# Patient Record
Sex: FEMALE | Race: WHITE | NOT HISPANIC OR LATINO | Employment: OTHER | URBAN - NONMETROPOLITAN AREA
[De-identification: names, ages, dates, MRNs, and addresses within clinical notes are randomized per-mention and may not be internally consistent; named-entity substitution may affect disease eponyms.]

---

## 2017-08-25 ENCOUNTER — TRANSCRIBE ORDERS (OUTPATIENT)
Dept: ADMINISTRATIVE | Facility: HOSPITAL | Age: 57
End: 2017-08-25

## 2017-08-25 DIAGNOSIS — Z12.31 VISIT FOR SCREENING MAMMOGRAM: Primary | ICD-10-CM

## 2017-09-13 ENCOUNTER — HOSPITAL ENCOUNTER (OUTPATIENT)
Dept: MAMMOGRAPHY | Facility: HOSPITAL | Age: 57
Discharge: HOME | End: 2017-09-13
Attending: INTERNAL MEDICINE | Admitting: INTERNAL MEDICINE

## 2017-09-13 DIAGNOSIS — Z12.31 VISIT FOR SCREENING MAMMOGRAM: ICD-10-CM

## 2017-09-13 PROCEDURE — G0202 SCR MAMMO BI INCL CAD: HCPCS

## 2017-09-13 PROCEDURE — 77063 BREAST TOMOSYNTHESIS BI: CPT | Performed by: RADIOLOGY

## 2017-09-13 PROCEDURE — 77063 BREAST TOMOSYNTHESIS BI: CPT

## 2017-09-13 PROCEDURE — 77067 SCR MAMMO BI INCL CAD: CPT | Performed by: RADIOLOGY

## 2017-10-25 ENCOUNTER — HOSPITAL ENCOUNTER (OUTPATIENT)
Dept: ULTRASOUND IMAGING | Facility: HOSPITAL | Age: 57
Discharge: HOME | End: 2017-10-25
Attending: RADIOLOGY | Admitting: RADIOLOGY

## 2017-10-25 DIAGNOSIS — R92.8 ABNORMAL MAMMOGRAM: ICD-10-CM

## 2017-10-25 PROCEDURE — 76642 ULTRASOUND BREAST LIMITED: CPT | Performed by: RADIOLOGY

## 2017-10-25 PROCEDURE — 76642 ULTRASOUND BREAST LIMITED: CPT

## 2019-05-30 ENCOUNTER — TRANSCRIBE ORDERS (OUTPATIENT)
Dept: LAB | Facility: HOSPITAL | Age: 59
End: 2019-05-30

## 2019-05-30 ENCOUNTER — HOSPITAL ENCOUNTER (OUTPATIENT)
Dept: GENERAL RADIOLOGY | Facility: HOSPITAL | Age: 59
Discharge: HOME | End: 2019-05-30
Admitting: NURSE PRACTITIONER

## 2019-05-30 DIAGNOSIS — M25.551 RIGHT HIP PAIN: ICD-10-CM

## 2019-05-30 DIAGNOSIS — M25.551 RIGHT HIP PAIN: Primary | ICD-10-CM

## 2019-05-30 PROCEDURE — 73502 X-RAY EXAM HIP UNI 2-3 VIEWS: CPT

## 2019-05-30 PROCEDURE — 73502 X-RAY EXAM HIP UNI 2-3 VIEWS: CPT | Performed by: RADIOLOGY

## 2020-05-15 ENCOUNTER — TRANSCRIBE ORDERS (OUTPATIENT)
Dept: ADMINISTRATIVE | Facility: HOSPITAL | Age: 60
End: 2020-05-15

## 2020-05-15 DIAGNOSIS — R74.8 ELEVATED LIVER ENZYMES: Primary | ICD-10-CM

## 2020-05-19 ENCOUNTER — HOSPITAL ENCOUNTER (OUTPATIENT)
Dept: ULTRASOUND IMAGING | Facility: HOSPITAL | Age: 60
Discharge: HOME | End: 2020-05-19
Admitting: NURSE PRACTITIONER

## 2020-05-19 DIAGNOSIS — R74.8 ELEVATED LIVER ENZYMES: ICD-10-CM

## 2020-05-19 PROCEDURE — 76700 US EXAM ABDOM COMPLETE: CPT

## 2020-05-19 PROCEDURE — 76700 US EXAM ABDOM COMPLETE: CPT | Performed by: RADIOLOGY

## 2021-03-10 ENCOUNTER — IMMUNIZATION (OUTPATIENT)
Dept: VACCINE CLINIC | Facility: HOSPITAL | Age: 61
End: 2021-03-10

## 2021-03-10 PROCEDURE — 0001A: CPT | Performed by: INTERNAL MEDICINE

## 2021-03-10 PROCEDURE — 91300 HC SARSCOV02 VAC 30MCG/0.3ML IM: CPT | Performed by: INTERNAL MEDICINE

## 2021-03-31 ENCOUNTER — IMMUNIZATION (OUTPATIENT)
Dept: VACCINE CLINIC | Facility: HOSPITAL | Age: 61
End: 2021-03-31

## 2021-03-31 PROCEDURE — 91300 HC SARSCOV02 VAC 30MCG/0.3ML IM: CPT | Performed by: INTERNAL MEDICINE

## 2021-03-31 PROCEDURE — 0002A: CPT | Performed by: INTERNAL MEDICINE

## 2021-05-12 ENCOUNTER — APPOINTMENT (OUTPATIENT)
Dept: MAMMOGRAPHY | Facility: HOSPITAL | Age: 61
End: 2021-05-12

## 2021-07-28 ENCOUNTER — HOSPITAL ENCOUNTER (OUTPATIENT)
Dept: HOSPITAL 79 - RAD | Age: 61
End: 2021-07-28
Attending: SURGERY
Payer: OTHER GOVERNMENT

## 2021-07-28 DIAGNOSIS — K59.00: ICD-10-CM

## 2021-07-28 DIAGNOSIS — K92.2: ICD-10-CM

## 2021-07-28 DIAGNOSIS — R10.32: Primary | ICD-10-CM

## 2021-09-03 ENCOUNTER — TRANSCRIBE ORDERS (OUTPATIENT)
Dept: ADMINISTRATIVE | Facility: HOSPITAL | Age: 61
End: 2021-09-03

## 2021-09-03 DIAGNOSIS — R01.1 HEART MURMUR: Primary | ICD-10-CM

## 2021-09-14 ENCOUNTER — HOSPITAL ENCOUNTER (OUTPATIENT)
Dept: CARDIOLOGY | Facility: HOSPITAL | Age: 61
Discharge: HOME | End: 2021-09-14
Admitting: NURSE PRACTITIONER

## 2021-09-14 DIAGNOSIS — R01.1 HEART MURMUR: ICD-10-CM

## 2021-09-14 LAB
BH CV ECHO MEAS - % IVS THICK: 6.9 %
BH CV ECHO MEAS - % LVPW THICK: 64.4 %
BH CV ECHO MEAS - ACS: 1.8 CM
BH CV ECHO MEAS - AO MAX PG: 16.5 MMHG
BH CV ECHO MEAS - AO MEAN PG: 7 MMHG
BH CV ECHO MEAS - AO ROOT AREA (BSA CORRECTED): 1.5
BH CV ECHO MEAS - AO ROOT AREA: 5.9 CM^2
BH CV ECHO MEAS - AO ROOT DIAM: 2.8 CM
BH CV ECHO MEAS - AO V2 MAX: 203 CM/SEC
BH CV ECHO MEAS - AO V2 MEAN: 123 CM/SEC
BH CV ECHO MEAS - AO V2 VTI: 36.4 CM
BH CV ECHO MEAS - BSA(HAYCOCK): 2 M^2
BH CV ECHO MEAS - BSA: 1.9 M^2
BH CV ECHO MEAS - BZI_BMI: 37.8 KILOGRAMS/M^2
BH CV ECHO MEAS - BZI_METRIC_HEIGHT: 154.9 CM
BH CV ECHO MEAS - BZI_METRIC_WEIGHT: 90.7 KG
BH CV ECHO MEAS - EDV(CUBED): 65 ML
BH CV ECHO MEAS - EDV(MOD-SP4): 33.9 ML
BH CV ECHO MEAS - EDV(TEICH): 70.8 ML
BH CV ECHO MEAS - EF(CUBED): 68.5 %
BH CV ECHO MEAS - EF(MOD-SP4): 51.9 %
BH CV ECHO MEAS - EF(TEICH): 60.6 %
BH CV ECHO MEAS - ESV(CUBED): 20.5 ML
BH CV ECHO MEAS - ESV(MOD-SP4): 16.3 ML
BH CV ECHO MEAS - ESV(TEICH): 27.9 ML
BH CV ECHO MEAS - FS: 32 %
BH CV ECHO MEAS - IVS/LVPW: 1.2
BH CV ECHO MEAS - IVSD: 0.97 CM
BH CV ECHO MEAS - IVSS: 1 CM
BH CV ECHO MEAS - LA DIMENSION: 2.7 CM
BH CV ECHO MEAS - LA/AO: 0.98
BH CV ECHO MEAS - LV DIASTOLIC VOL/BSA (35-75): 17.9 ML/M^2
BH CV ECHO MEAS - LV MASS(C)D: 109.5 GRAMS
BH CV ECHO MEAS - LV MASS(C)DI: 58 GRAMS/M^2
BH CV ECHO MEAS - LV MASS(C)S: 94.9 GRAMS
BH CV ECHO MEAS - LV MASS(C)SI: 50.2 GRAMS/M^2
BH CV ECHO MEAS - LV SYSTOLIC VOL/BSA (12-30): 8.6 ML/M^2
BH CV ECHO MEAS - LVIDD: 4 CM
BH CV ECHO MEAS - LVIDS: 2.7 CM
BH CV ECHO MEAS - LVLD AP4: 6.7 CM
BH CV ECHO MEAS - LVLS AP4: 6.7 CM
BH CV ECHO MEAS - LVOT AREA (M): 2.3 CM^2
BH CV ECHO MEAS - LVOT AREA: 2.3 CM^2
BH CV ECHO MEAS - LVOT DIAM: 1.7 CM
BH CV ECHO MEAS - LVPWD: 0.82 CM
BH CV ECHO MEAS - LVPWS: 1.3 CM
BH CV ECHO MEAS - MV A MAX VEL: 98.5 CM/SEC
BH CV ECHO MEAS - MV E MAX VEL: 79 CM/SEC
BH CV ECHO MEAS - MV E/A: 0.8
BH CV ECHO MEAS - PA ACC TIME: 0.09 SEC
BH CV ECHO MEAS - PA PR(ACCEL): 37.6 MMHG
BH CV ECHO MEAS - RAP SYSTOLE: 10 MMHG
BH CV ECHO MEAS - RVSP: 28.5 MMHG
BH CV ECHO MEAS - SI(AO): 114.4 ML/M^2
BH CV ECHO MEAS - SI(CUBED): 23.6 ML/M^2
BH CV ECHO MEAS - SI(MOD-SP4): 9.3 ML/M^2
BH CV ECHO MEAS - SI(TEICH): 22.7 ML/M^2
BH CV ECHO MEAS - SV(AO): 216.2 ML
BH CV ECHO MEAS - SV(CUBED): 44.5 ML
BH CV ECHO MEAS - SV(MOD-SP4): 17.6 ML
BH CV ECHO MEAS - SV(TEICH): 42.9 ML
BH CV ECHO MEAS - TR MAX VEL: 215 CM/SEC
MAXIMAL PREDICTED HEART RATE: 158 BPM
STRESS TARGET HR: 134 BPM

## 2021-09-14 PROCEDURE — 93306 TTE W/DOPPLER COMPLETE: CPT | Performed by: INTERNAL MEDICINE

## 2021-09-14 PROCEDURE — 93306 TTE W/DOPPLER COMPLETE: CPT

## 2021-12-23 ENCOUNTER — HOSPITAL ENCOUNTER (OUTPATIENT)
Dept: GENERAL RADIOLOGY | Facility: HOSPITAL | Age: 61
Discharge: HOME | End: 2021-12-23
Admitting: NURSE PRACTITIONER

## 2021-12-23 ENCOUNTER — TRANSCRIBE ORDERS (OUTPATIENT)
Dept: ADMINISTRATIVE | Facility: HOSPITAL | Age: 61
End: 2021-12-23

## 2021-12-23 DIAGNOSIS — M25.552 LEFT HIP PAIN: ICD-10-CM

## 2021-12-23 DIAGNOSIS — M25.552 LEFT HIP PAIN: Primary | ICD-10-CM

## 2021-12-23 PROCEDURE — 73502 X-RAY EXAM HIP UNI 2-3 VIEWS: CPT | Performed by: RADIOLOGY

## 2021-12-23 PROCEDURE — 73502 X-RAY EXAM HIP UNI 2-3 VIEWS: CPT

## 2022-03-22 ENCOUNTER — HOSPITAL ENCOUNTER (OUTPATIENT)
Dept: HOSPITAL 79 - OPSV2 | Age: 62
End: 2022-03-22
Attending: ORTHOPAEDIC SURGERY
Payer: OTHER GOVERNMENT

## 2022-03-22 DIAGNOSIS — Z01.818: Primary | ICD-10-CM

## 2022-03-22 DIAGNOSIS — M16.12: ICD-10-CM

## 2022-03-22 LAB
BUN/CREATININE RATIO: 33 (ref 0–10)
HGB BLD-MCNC: 14.8 GM/DL (ref 12.3–15.3)
RED BLOOD COUNT: 4.76 M/UL (ref 4–5.1)
WHITE BLOOD COUNT: 10.8 K/UL (ref 4.5–11)

## 2022-04-03 ENCOUNTER — HOSPITAL ENCOUNTER (OUTPATIENT)
Dept: HOSPITAL 79 - LAB | Age: 62
End: 2022-04-03
Attending: ORTHOPAEDIC SURGERY
Payer: OTHER GOVERNMENT

## 2022-04-03 DIAGNOSIS — Z01.812: Primary | ICD-10-CM

## 2022-04-03 LAB — BUN/CREATININE RATIO: 44 (ref 0–10)

## 2022-04-14 ENCOUNTER — TREATMENT (OUTPATIENT)
Dept: PHYSICAL THERAPY | Facility: CLINIC | Age: 62
End: 2022-04-14

## 2022-04-14 DIAGNOSIS — M25.552 LEFT HIP PAIN: ICD-10-CM

## 2022-04-14 DIAGNOSIS — Z96.642 HISTORY OF TOTAL LEFT HIP REPLACEMENT: Primary | ICD-10-CM

## 2022-04-14 PROCEDURE — 97162 PT EVAL MOD COMPLEX 30 MIN: CPT | Performed by: PHYSICAL THERAPIST

## 2022-04-14 NOTE — PROGRESS NOTES
Physical Therapy Initial Evaluation and Plan of Care    Patient: Indu Richards   : 1960  Diagnosis/ICD-10 Code:  History of total left hip replacement [Z96.642]  Referring practitioner: Clif Mesa MD  Date of Initial Visit: 2022  Today's Date: 2022  Patient seen for 1 session         Visit Diagnoses:    ICD-10-CM ICD-9-CM   1. History of total left hip replacement  Z96.642 V43.64   2. Left hip pain  M25.552 719.45         Subjective Questionnaire: LEFS: 84%      Subjective Evaluation    History of Present Illness  Date of surgery: 2022  Mechanism of injury: The patient has suffered from bilateral hip pain for the past two years.  The patient received a THR to the right hip in 2019.  She reports her left hip pain intensified this past Summer.  The patient was referred to MD Mesa and scheduled for a left hip EUGENIO on 2022.  Pt had no complications with surgery and was advised to receive therapy at this time.        Patient Occupation: retired   Precautions and Work Restrictions: WBAT; EUGENIO anterior approachQuality of life: good    Pain  Current pain ratin  At best pain ratin  At worst pain ratin  Location: left hip  Quality: burning, cramping, dull ache and sharp  Relieving factors: medications, ice, relaxation and rest  Aggravating factors: movement, stairs, standing, ambulation, squatting and repetitive movement  Progression: improved    Hand dominance: left    Patient Goals  Patient goals for therapy: decreased edema, decreased pain, improved balance, increased motion, increased strength, independence with ADLs/IADLs and return to sport/leisure activities             Objective          Observations     Additional Hip Observation Details  Pt reports left wound dressing is clean; decreased wb on left buttock in sitting    Neurological Testing     Sensation     Hip   Left Hip   Intact: light touch    Right Hip   Intact: light touch    Strength/Myotome  Testing     Left Hip   Planes of Motion   Flexion: 3- (NT with resistance)    Right Hip   Planes of Motion   Flexion: 4    Left Knee   Flexion: 3  Extension: 3+    Right Knee   Flexion: 4+  Extension: 4+    Left Ankle/Foot   Dorsiflexion: 4-  Plantar flexion: 4-    Right Ankle/Foot   Dorsiflexion: 4  Plantar flexion: 4    Ambulation     Comments   Pt amb with fwd flexed posture with decreased stance on left LE          Assessment & Plan     Assessment  Impairments: abnormal coordination, abnormal gait, abnormal muscle firing, abnormal muscle tone, abnormal or restricted ROM, activity intolerance, impaired balance, impaired physical strength, lacks appropriate home exercise program, pain with function, safety issue and weight-bearing intolerance  Functional Limitations: lifting, walking, pulling, pushing, uncomfortable because of pain, moving in bed, sitting, standing, stooping and unable to perform repetitive tasks  Assessment details: Pt is a 61 y/o female referred to therapy following a left EUGENIO.  Pt presents with decreased bed mobility, impaired transfers and impaired gait.  Therapy will follow for improved leg stability and safety with gait in order to improve functional independence.  Prognosis: good    Goals  Plan Goals: STG 6 weeks    1 Pt will be instructed in a HEP.  2 Pt will be able to amb household distance with use of cane ore lesser AD.  3 Pt will demonstrate 4/5 bilateral LE gross strength.    LTG  12 weeks    1 Pt will improve her LEFs to less than 30%.  2 Pt will report pain no greater than 3/10 with ADLs.  3 Pt will amb with no AD with good symmetry and stride length.  4 Pt will be able to walk for greater than 10 min without required rest breaks.    Plan  Therapy options: will be seen for skilled therapy services  Planned modality interventions: thermotherapy (hydrocollator packs) and cryotherapy  Planned therapy interventions: abdominal trunk stabilization, ADL retraining, balance/weight-bearing  training, body mechanics training, flexibility, functional ROM exercises, gait training, home exercise program, IADL retraining, joint mobilization, manual therapy, neuromuscular re-education, postural training, soft tissue mobilization, strengthening, stretching, therapeutic activities and motor coordination training  Frequency: 2x week  Duration in weeks: 12  Treatment plan discussed with: patient  Plan details: Will follow for optimal gains.  Moderate Evaluation  03847    Re-evaluation   16839  Therapeutic exercise  19590  Therapeutic activity    17230  Neuromuscular re-education   39682  Manual therapy   75864  Gait training  44818  Unattended e-stim (Medicaid/Medicare)     Moist heat/cryotherapy 49782   Ultrasound   05506            Timed:         Manual Therapy:         mins  65501;     Therapeutic Exercise:         mins  29302;     Neuromuscular Juan Daniel:        mins  31147;    Therapeutic Activity:          mins  24102;     Gait Training:           mins  59318;     Ultrasound:          mins  79897;    Ionto                                   mins   35341  Self Care                            mins   15272  Canalith Repos         mins 12793      Un-Timed:  Electrical Stimulation:         mins  86077 ( );  Dry Needling          mins self-pay  Traction          mins 70030  Low Eval          Mins  63454  Mod Eval     40     Mins  52608  High Eval                            Mins  17312        Timed Treatment:      mins   Total Treatment:     40   mins          PT: Zachery Coronel PT     License Number: [id number]  Electronically signed by Zachery Coronel PT, 04/14/22, 8:54 AM EDT    Certification Period: 4/14/2022 thru 7/12/2022  I certify that the therapy services are furnished while this patient is under my care.  The services outlined above are required by this patient, and will be reviewed every 90 days.         Physician Signature:__________________________________________________    PHYSICIAN:  Clif Mesa MD  NPI: [id number]                                      DATE:      Please sign and return via fax to [email or web address] . Thank you, [facility].

## 2022-04-19 ENCOUNTER — TREATMENT (OUTPATIENT)
Dept: PHYSICAL THERAPY | Facility: CLINIC | Age: 62
End: 2022-04-19

## 2022-04-19 DIAGNOSIS — Z96.642 HISTORY OF TOTAL LEFT HIP REPLACEMENT: Primary | ICD-10-CM

## 2022-04-19 DIAGNOSIS — M25.552 LEFT HIP PAIN: ICD-10-CM

## 2022-04-19 PROCEDURE — 97110 THERAPEUTIC EXERCISES: CPT | Performed by: PHYSICAL THERAPIST

## 2022-04-19 PROCEDURE — 97116 GAIT TRAINING THERAPY: CPT | Performed by: PHYSICAL THERAPIST

## 2022-04-19 NOTE — PROGRESS NOTES
Physical Therapy Daily Treatment Note      Patient: Indu Richards   : 1960  Referring practitioner: Clif Mesa MD  Date of Initial Visit: Type: THERAPY  Noted: 2022  Today's Date: 2022  Patient seen for 2 sessions       Visit Diagnoses:    ICD-10-CM ICD-9-CM   1. History of total left hip replacement  Z96.642 V43.64   2. Left hip pain  M25.552 719.45       Subjective:  Patient arrives to therapy w/ reports of 3/10 left hip pain.  Pt verbalizes compliance of initiating home program w/ no complaints.  Pt states she has been walking with a cane the past few days.      Objective   See Exercise, Manual, and Modality Logs for complete treatment.       Assessment/Plan:  Patient responded well to today's session w/ no complaints of pain increase noted following, 3/10.  Treatment initiated w/ supine and seated mat strengthening and range of motion activities f/b weight shifts and gait training w/ assistive device.  Pt was provided w/ cues and demonstration during exercise and gait to maintain good form, and for max benefit.  Pt was educated to utilize rolling walker intermittently with cane as needed if pain increases with walking.  Pt verbalized understanding.  Cryotherapy applied at conclusion.  No adverse reactions or signs of distress observed during, and/ or following tx.  Pt continues to benefit from therapy services, and will be progressed as tolerated to address goals, improve strength, and restore gait.  Continue w/ PT's POC.       Timed:         Manual Therapy:         mins  20382;     Therapeutic Exercise:    36     mins  72258;     Neuromuscular Juan Daniel:        mins  56645;    Therapeutic Activity:          mins  21016;     Gait Trainin     mins  73734;     Ultrasound:          mins  17764;    Ionto                                   mins   66514  Self Care                            mins   29656  Canalith Repos         mins 81633      Un-Timed:  Electrical Stimulation:          mins  43639 ( );  Dry Needling          mins self-pay  Traction          mins 60473      Timed Treatment:  45    mins   Total Treatment:    53    mins (CP: 8 min)    Karuna Jade. MARCI Agee  [place] License: [id number]

## 2022-04-26 ENCOUNTER — TREATMENT (OUTPATIENT)
Dept: PHYSICAL THERAPY | Facility: CLINIC | Age: 62
End: 2022-04-26

## 2022-04-26 DIAGNOSIS — M25.552 LEFT HIP PAIN: ICD-10-CM

## 2022-04-26 DIAGNOSIS — Z96.642 HISTORY OF TOTAL LEFT HIP REPLACEMENT: Primary | ICD-10-CM

## 2022-04-26 PROCEDURE — 97110 THERAPEUTIC EXERCISES: CPT | Performed by: PHYSICAL THERAPIST

## 2022-04-26 PROCEDURE — 97116 GAIT TRAINING THERAPY: CPT | Performed by: PHYSICAL THERAPIST

## 2022-04-26 NOTE — PROGRESS NOTES
Physical Therapy Daily Treatment Note      Patient: Indu Richards   : 1960  Referring practitioner: Clif Mesa MD  Date of Initial Visit: Type: THERAPY  Noted: 2022  Today's Date: 2022  Patient seen for 3 sessions       Visit Diagnoses:    ICD-10-CM ICD-9-CM   1. History of total left hip replacement  Z96.642 V43.64   2. Left hip pain  M25.552 719.45       Subjective Evaluation    History of Present Illness    Subjective comment: Pt reports going to the doctor on 22 and the bandages were removed. She states she had an area that was almost raw so she was unable to attend her PT session on 22. She states the area has improved. The patient reports 3/10 left hip pain prior to today's session. The patient reports she has the greatest amount of difficulty with right sciatic pain..Pain  Current pain rating: 3           Objective   See Exercise, Manual, and Modality Logs for complete treatment.       Assessment & Plan     Assessment    Assessment details: Therapeutic exercises were performed during today's treatment session for improved left lower extremity range of motion and strength. She demonstrated improvement with heel slides on sliding board, with patient able to perform five repetitions without assistance. Gait training was performed with a quad cane and an emphasis on weight shift. The patient was able to ascend/descend stairs with reciprocal gait and use of two handrails. Rest breaks were provided as needed secondary to fatigue. The patient reported 5/10 left hip pain following today's session.    Plan  Plan details: Progress weight bearing and weight shifting activities for improved balance and gait.          Timed:         Manual Therapy:         mins  93494;     Therapeutic Exercise:    35     mins  17592;     Neuromuscular Juan Daniel:        mins  40907;    Therapeutic Activity:          mins  68593;     Gait Training:     10      mins  45801;     Ultrasound:          mins   41300;    Ionto                                   mins   74344  Self Care                            mins   23554  Canalith Repos         mins 73008      Un-Timed:  Electrical Stimulation:         mins  68507 ( );  Dry Needling          mins self-pay  Traction          mins 53623      Timed Treatment:   45   mins   Total Treatment:     45   mins    Ashley Claudene Dalton, PT  [place] License: [id number]

## 2022-04-29 ENCOUNTER — TREATMENT (OUTPATIENT)
Dept: PHYSICAL THERAPY | Facility: CLINIC | Age: 62
End: 2022-04-29

## 2022-04-29 DIAGNOSIS — Z96.642 HISTORY OF TOTAL LEFT HIP REPLACEMENT: Primary | ICD-10-CM

## 2022-04-29 DIAGNOSIS — M25.552 LEFT HIP PAIN: ICD-10-CM

## 2022-04-29 PROCEDURE — 97110 THERAPEUTIC EXERCISES: CPT | Performed by: PHYSICAL THERAPIST

## 2022-04-29 PROCEDURE — 97116 GAIT TRAINING THERAPY: CPT | Performed by: PHYSICAL THERAPIST

## 2022-04-29 NOTE — PROGRESS NOTES
Physical Therapy Daily Treatment Note      Patient: Indu Richards   : 1960  Referring practitioner: Clif Mesa MD  Date of Initial Visit: Type: THERAPY  Noted: 2022  Today's Date: 2022  Patient seen for 4 sessions       Visit Diagnoses:    ICD-10-CM ICD-9-CM   1. History of total left hip replacement  Z96.642 V43.64   2. Left hip pain  M25.552 719.45       Subjective   Patient reports that she is sore prior yo treatment session. Patient states that she is up on her feet more so she is having more swelling in the left hip.    Objective   See Exercise, Manual, and Modality Logs for complete treatment.       Assessment/Plan  Patient tolerated treatment session well with rest breaks taken as needed by the patient. Educated patient to perform therex per her tolerance, patient verbalized understanding. No adverse reactions with modalities or treatment session. Reps increased on several exercises with no increase in pain noted. New therex added per the patient's tolerance, patient demonstrated and understood new therex with no increase in pain noted. Patient educated on continuing to use assistive device when out of house, patient verbalized understanding. Treatment session consisted of exercises to improve strength and ROM to the left hip. No pain noted following treatment session. Patient requested to ice her left hip at home. Continue per PT's POC, progress per the patient's tolerance.    Timed:         Manual Therapy:         mins  82324;     Therapeutic Exercise:    35     mins  57472;     Neuromuscular Juan Daniel:        mins  86231;    Therapeutic Activity:          mins  60498;     Gait Training:      10     mins  41636;     Ultrasound:          mins  84299;    Ionto                                   mins   27731  Self Care                            mins   92170  Canalith Repos         mins 08114      Un-Timed:  Electrical Stimulation:         mins  91005 ( );  Dry Needling           mins self-pay  Traction          mins 04561      Timed Treatment:   45   mins   Total Treatment:     45   mins    Ankita Henson, PTA  [place] License: [id number]

## 2022-05-03 ENCOUNTER — TREATMENT (OUTPATIENT)
Dept: PHYSICAL THERAPY | Facility: CLINIC | Age: 62
End: 2022-05-03

## 2022-05-03 DIAGNOSIS — M25.552 LEFT HIP PAIN: ICD-10-CM

## 2022-05-03 DIAGNOSIS — Z96.642 HISTORY OF TOTAL LEFT HIP REPLACEMENT: Primary | ICD-10-CM

## 2022-05-03 PROCEDURE — 97116 GAIT TRAINING THERAPY: CPT | Performed by: PHYSICAL THERAPIST

## 2022-05-03 PROCEDURE — 97110 THERAPEUTIC EXERCISES: CPT | Performed by: PHYSICAL THERAPIST

## 2022-05-03 NOTE — PROGRESS NOTES
Physical Therapy Daily Treatment Note      Patient: Indu Richards   : 1960  Referring practitioner: Clif Mesa MD  Date of Initial Visit: Type: THERAPY  Noted: 2022  Today's Date: 5/3/2022  Patient seen for 5 sessions       Visit Diagnoses:    ICD-10-CM ICD-9-CM   1. History of total left hip replacement  Z96.642 V43.64   2. Left hip pain  M25.552 719.45       Subjective Evaluation    History of Present Illness    Subjective comment: Pt reports having 5/10 pain today and pt did not sleep well last night.       Objective   See Exercise, Manual, and Modality Logs for complete treatment.       Assessment & Plan     Assessment    Assessment details: Tx today consisted of supine and sitting exercises; followed by standing pre gait and stair training. Pt responded well to added HR and responded well to added reps with most exercises today.  Pt was noted to have impairments with wt shiftng with gait today and reported mild pain with wt shifting slowly.  Pt reported 3/10 post pain with ice.    Plan  Plan details: Will follow progressing hip stability per protocol.          Timed:         Manual Therapy:         mins  60163;     Therapeutic Exercise:    32     mins  91946;     Neuromuscular Juan Daniel:        mins  16623;    Therapeutic Activity:          mins  35154;     Gait Trainin     mins  34639;     Ultrasound:          mins  66656;    Ionto                                   mins   84194  Self Care                            mins   48193  Canalith Repos         mins 42080      Un-Timed:  Electrical Stimulation:         mins  61842 ( );  Dry Needling          mins self-pay  Traction          mins 72557      Timed Treatment:   44   mins   Total Treatment:     44   mins    Zachery Coronel PT  [place] License: [id number]      Electronically signed by Zachery Coronel PT, 22, 8:54 AM EDT

## 2022-05-10 ENCOUNTER — TELEPHONE (OUTPATIENT)
Dept: PHYSICAL THERAPY | Facility: CLINIC | Age: 62
End: 2022-05-10

## 2022-07-12 ENCOUNTER — TRANSCRIBE ORDERS (OUTPATIENT)
Dept: ADMINISTRATIVE | Facility: HOSPITAL | Age: 62
End: 2022-07-12

## 2022-07-12 DIAGNOSIS — M54.41 CHRONIC RIGHT-SIDED LOW BACK PAIN WITH RIGHT-SIDED SCIATICA: Primary | ICD-10-CM

## 2022-07-12 DIAGNOSIS — G89.29 CHRONIC RIGHT-SIDED LOW BACK PAIN WITH RIGHT-SIDED SCIATICA: Primary | ICD-10-CM

## 2022-07-25 ENCOUNTER — HOSPITAL ENCOUNTER (OUTPATIENT)
Dept: MRI IMAGING | Facility: HOSPITAL | Age: 62
Discharge: HOME | End: 2022-07-25

## 2022-07-25 DIAGNOSIS — M54.41 CHRONIC RIGHT-SIDED LOW BACK PAIN WITH RIGHT-SIDED SCIATICA: ICD-10-CM

## 2022-07-25 DIAGNOSIS — G89.29 CHRONIC RIGHT-SIDED LOW BACK PAIN WITH RIGHT-SIDED SCIATICA: ICD-10-CM

## 2022-07-25 PROCEDURE — 72148 MRI LUMBAR SPINE W/O DYE: CPT

## 2022-07-25 PROCEDURE — 72148 MRI LUMBAR SPINE W/O DYE: CPT | Performed by: RADIOLOGY

## 2022-08-22 ENCOUNTER — APPOINTMENT (OUTPATIENT)
Dept: MAMMOGRAPHY | Facility: HOSPITAL | Age: 62
End: 2022-08-22

## 2023-02-16 ENCOUNTER — HOSPITAL ENCOUNTER (OUTPATIENT)
Dept: MAMMOGRAPHY | Facility: HOSPITAL | Age: 63
Discharge: HOME | End: 2023-02-16
Admitting: NURSE PRACTITIONER
Payer: OTHER GOVERNMENT

## 2023-02-16 DIAGNOSIS — Z12.31 VISIT FOR SCREENING MAMMOGRAM: ICD-10-CM

## 2023-02-16 PROCEDURE — 77067 SCR MAMMO BI INCL CAD: CPT

## 2023-02-16 PROCEDURE — 77063 BREAST TOMOSYNTHESIS BI: CPT

## 2023-02-16 PROCEDURE — 77063 BREAST TOMOSYNTHESIS BI: CPT | Performed by: RADIOLOGY

## 2023-02-16 PROCEDURE — 77067 SCR MAMMO BI INCL CAD: CPT | Performed by: RADIOLOGY

## 2023-05-10 ENCOUNTER — TRANSCRIBE ORDERS (OUTPATIENT)
Dept: ADMINISTRATIVE | Facility: HOSPITAL | Age: 63
End: 2023-05-10
Payer: OTHER GOVERNMENT

## 2023-05-10 ENCOUNTER — HOSPITAL ENCOUNTER (OUTPATIENT)
Dept: CARDIOLOGY | Facility: HOSPITAL | Age: 63
Discharge: HOME | End: 2023-05-10
Admitting: NURSE PRACTITIONER
Payer: OTHER GOVERNMENT

## 2023-05-10 DIAGNOSIS — Z86.718 PERSONAL HISTORY OF VENOUS THROMBOSIS AND EMBOLISM: ICD-10-CM

## 2023-05-10 DIAGNOSIS — Z86.718 PERSONAL HISTORY OF VENOUS THROMBOSIS AND EMBOLISM: Primary | ICD-10-CM

## 2023-05-10 PROCEDURE — 93971 EXTREMITY STUDY: CPT

## 2023-05-10 PROCEDURE — 93971 EXTREMITY STUDY: CPT | Performed by: RADIOLOGY

## 2023-08-16 ENCOUNTER — HOSPITAL ENCOUNTER (OUTPATIENT)
Dept: CARDIOLOGY | Facility: HOSPITAL | Age: 63
Discharge: HOME | End: 2023-08-16
Admitting: NURSE PRACTITIONER
Payer: OTHER GOVERNMENT

## 2023-08-16 ENCOUNTER — TRANSCRIBE ORDERS (OUTPATIENT)
Dept: ADMINISTRATIVE | Facility: HOSPITAL | Age: 63
End: 2023-08-16
Payer: OTHER GOVERNMENT

## 2023-08-16 DIAGNOSIS — Z86.718 PERSONAL HISTORY OF VENOUS THROMBOSIS AND EMBOLISM: Primary | ICD-10-CM

## 2023-08-16 DIAGNOSIS — Z86.718 PERSONAL HISTORY OF VENOUS THROMBOSIS AND EMBOLISM: ICD-10-CM

## 2023-08-16 PROCEDURE — 93970 EXTREMITY STUDY: CPT

## 2023-09-07 ENCOUNTER — TRANSCRIBE ORDERS (OUTPATIENT)
Dept: ADMINISTRATIVE | Facility: HOSPITAL | Age: 63
End: 2023-09-07
Payer: OTHER GOVERNMENT

## 2023-09-07 DIAGNOSIS — M54.16 LUMBAR RADICULOPATHY: Primary | ICD-10-CM

## 2023-09-18 ENCOUNTER — HOSPITAL ENCOUNTER (OUTPATIENT)
Dept: MRI IMAGING | Facility: HOSPITAL | Age: 63
Discharge: HOME | End: 2023-09-18
Admitting: NURSE PRACTITIONER
Payer: OTHER GOVERNMENT

## 2023-09-18 DIAGNOSIS — M54.16 LUMBAR RADICULOPATHY: ICD-10-CM

## 2023-09-18 PROCEDURE — 72148 MRI LUMBAR SPINE W/O DYE: CPT

## 2024-04-05 ENCOUNTER — LAB REQUISITION (OUTPATIENT)
Dept: LAB | Facility: HOSPITAL | Age: 64
End: 2024-04-05
Payer: MEDICARE

## 2024-04-05 DIAGNOSIS — I26.90 SEPTIC PULMONARY EMBOLISM WITHOUT ACUTE COR PULMONALE: ICD-10-CM

## 2024-04-05 DIAGNOSIS — R30.0 DYSURIA: ICD-10-CM

## 2024-04-05 LAB
BACTERIA UR QL AUTO: ABNORMAL /HPF
BILIRUB UR QL STRIP: NEGATIVE
CLARITY UR: ABNORMAL
COD CRY URNS QL: ABNORMAL /HPF
COLOR UR: YELLOW
GLUCOSE UR STRIP-MCNC: NEGATIVE MG/DL
HGB UR QL STRIP.AUTO: ABNORMAL
HYALINE CASTS UR QL AUTO: ABNORMAL /LPF
INR PPP: 1.41 (ref 0.9–1.1)
KETONES UR QL STRIP: NEGATIVE
LEUKOCYTE ESTERASE UR QL STRIP.AUTO: ABNORMAL
NITRITE UR QL STRIP: NEGATIVE
PH UR STRIP.AUTO: 6 [PH] (ref 5–8)
PROT UR QL STRIP: ABNORMAL
PROTHROMBIN TIME: 17.8 SECONDS (ref 12.1–14.7)
RBC # UR STRIP: ABNORMAL /HPF
REF LAB TEST METHOD: ABNORMAL
SP GR UR STRIP: 1.02 (ref 1–1.03)
SQUAMOUS #/AREA URNS HPF: ABNORMAL /HPF
UROBILINOGEN UR QL STRIP: ABNORMAL
WBC # UR STRIP: ABNORMAL /HPF
YEAST URNS QL MICRO: ABNORMAL /HPF

## 2024-04-05 PROCEDURE — 87086 URINE CULTURE/COLONY COUNT: CPT | Performed by: NURSE PRACTITIONER

## 2024-04-05 PROCEDURE — 81001 URINALYSIS AUTO W/SCOPE: CPT | Performed by: NURSE PRACTITIONER

## 2024-04-05 PROCEDURE — 85610 PROTHROMBIN TIME: CPT | Performed by: NURSE PRACTITIONER

## 2024-04-06 LAB — BACTERIA SPEC AEROBE CULT: NORMAL

## 2024-04-08 ENCOUNTER — LAB REQUISITION (OUTPATIENT)
Dept: LAB | Facility: HOSPITAL | Age: 64
End: 2024-04-08
Payer: MEDICARE

## 2024-04-08 DIAGNOSIS — I26.90 SEPTIC PULMONARY EMBOLISM WITHOUT ACUTE COR PULMONALE: ICD-10-CM

## 2024-04-08 LAB
INR PPP: 2.22 (ref 0.9–1.1)
PROTHROMBIN TIME: 25.3 SECONDS (ref 12.1–14.7)

## 2024-04-08 PROCEDURE — 85610 PROTHROMBIN TIME: CPT | Performed by: NURSE PRACTITIONER

## 2024-04-12 ENCOUNTER — LAB REQUISITION (OUTPATIENT)
Dept: LAB | Facility: HOSPITAL | Age: 64
End: 2024-04-12
Payer: MEDICARE

## 2024-04-12 DIAGNOSIS — I26.90 SEPTIC PULMONARY EMBOLISM WITHOUT ACUTE COR PULMONALE: ICD-10-CM

## 2024-04-12 LAB
INR PPP: 2.83 (ref 0.9–1.1)
PROTHROMBIN TIME: 30.5 SECONDS (ref 12.1–14.7)

## 2024-04-12 PROCEDURE — 85610 PROTHROMBIN TIME: CPT | Performed by: NURSE PRACTITIONER

## 2024-04-19 ENCOUNTER — LAB REQUISITION (OUTPATIENT)
Dept: LAB | Facility: HOSPITAL | Age: 64
End: 2024-04-19
Payer: MEDICARE

## 2024-04-19 DIAGNOSIS — Z79.01 LONG TERM (CURRENT) USE OF ANTICOAGULANTS: ICD-10-CM

## 2024-04-19 LAB
INR PPP: 2.22 (ref 0.9–1.1)
PROTHROMBIN TIME: 25.3 SECONDS (ref 12.1–14.7)

## 2024-04-19 PROCEDURE — 85610 PROTHROMBIN TIME: CPT | Performed by: INTERNAL MEDICINE

## 2024-08-21 ENCOUNTER — HOSPITAL ENCOUNTER (OUTPATIENT)
Dept: GENERAL RADIOLOGY | Facility: HOSPITAL | Age: 64
Discharge: HOME | End: 2024-08-21
Admitting: NURSE PRACTITIONER
Payer: MEDICARE

## 2024-08-21 ENCOUNTER — TRANSCRIBE ORDERS (OUTPATIENT)
Dept: ADMINISTRATIVE | Facility: HOSPITAL | Age: 64
End: 2024-08-21
Payer: OTHER GOVERNMENT

## 2024-08-21 DIAGNOSIS — Z98.1 S/P LUMBAR SPINAL FUSION: Primary | ICD-10-CM

## 2024-08-21 DIAGNOSIS — Z98.1 S/P LUMBAR SPINAL FUSION: ICD-10-CM

## 2024-08-21 PROCEDURE — 72100 X-RAY EXAM L-S SPINE 2/3 VWS: CPT

## 2024-11-21 ENCOUNTER — HOSPITAL ENCOUNTER (OUTPATIENT)
Dept: GENERAL RADIOLOGY | Facility: HOSPITAL | Age: 64
Discharge: HOME | End: 2024-11-21
Admitting: NURSE PRACTITIONER
Payer: MEDICARE

## 2024-11-21 ENCOUNTER — TRANSCRIBE ORDERS (OUTPATIENT)
Dept: ADMINISTRATIVE | Facility: HOSPITAL | Age: 64
End: 2024-11-21
Payer: OTHER GOVERNMENT

## 2024-11-21 DIAGNOSIS — Z98.1 S/P SPINAL FUSION: Primary | ICD-10-CM

## 2024-11-21 DIAGNOSIS — Q76.49 SPINAL FUSION, CONGENITAL: ICD-10-CM

## 2024-11-21 PROCEDURE — 72100 X-RAY EXAM L-S SPINE 2/3 VWS: CPT | Performed by: RADIOLOGY

## 2024-11-21 PROCEDURE — 72100 X-RAY EXAM L-S SPINE 2/3 VWS: CPT
